# Patient Record
Sex: MALE | Race: BLACK OR AFRICAN AMERICAN | NOT HISPANIC OR LATINO | URBAN - METROPOLITAN AREA
[De-identification: names, ages, dates, MRNs, and addresses within clinical notes are randomized per-mention and may not be internally consistent; named-entity substitution may affect disease eponyms.]

---

## 2021-04-30 ENCOUNTER — EMERGENCY (EMERGENCY)
Facility: HOSPITAL | Age: 61
LOS: 1 days | Discharge: HOME | End: 2021-04-30
Admitting: EMERGENCY MEDICINE
Payer: MEDICAID

## 2021-04-30 VITALS
SYSTOLIC BLOOD PRESSURE: 120 MMHG | WEIGHT: 229.94 LBS | HEART RATE: 73 BPM | OXYGEN SATURATION: 97 % | HEIGHT: 68 IN | TEMPERATURE: 98 F | DIASTOLIC BLOOD PRESSURE: 82 MMHG | RESPIRATION RATE: 16 BRPM

## 2021-04-30 DIAGNOSIS — M25.561 PAIN IN RIGHT KNEE: ICD-10-CM

## 2021-04-30 DIAGNOSIS — G89.29 OTHER CHRONIC PAIN: ICD-10-CM

## 2021-04-30 DIAGNOSIS — I10 ESSENTIAL (PRIMARY) HYPERTENSION: ICD-10-CM

## 2021-04-30 DIAGNOSIS — Z88.0 ALLERGY STATUS TO PENICILLIN: ICD-10-CM

## 2021-04-30 DIAGNOSIS — M25.461 EFFUSION, RIGHT KNEE: ICD-10-CM

## 2021-04-30 DIAGNOSIS — I63.9 CEREBRAL INFARCTION, UNSPECIFIED: ICD-10-CM

## 2021-04-30 PROCEDURE — 73564 X-RAY EXAM KNEE 4 OR MORE: CPT

## 2021-04-30 PROCEDURE — 73564 X-RAY EXAM KNEE 4 OR MORE: CPT | Mod: 26,RT

## 2021-04-30 PROCEDURE — 96372 THER/PROPH/DIAG INJ SC/IM: CPT

## 2021-04-30 PROCEDURE — 99283 EMERGENCY DEPT VISIT LOW MDM: CPT | Mod: 25

## 2021-04-30 RX ORDER — KETOROLAC TROMETHAMINE 30 MG/ML
30 SYRINGE (ML) INJECTION ONCE
Refills: 0 | Status: DISCONTINUED | OUTPATIENT
Start: 2021-04-30 | End: 2021-04-30

## 2021-04-30 RX ADMIN — Medication 30 MILLIGRAM(S): at 13:49

## 2021-04-30 NOTE — ED PROVIDER NOTE - PHYSICAL EXAMINATION
Vitals reviewed  Gen: well appearing, nad, speaking in full sentences  Skin: wwp, no rash/lesions/erythema or warmth   HEENT: ncat, eomi, mmm  CV: rrr, no audible m/r/g  Resp: symmetrical expansion, ctab, no w/r/r  RLE: pain w/ ROM knee but FROM all joints, mild swelling knee but no effusion palpable, + ttp medial aspect, DP/PT pulse 2+, SILT, no calf ttp/edema  FROM all other ext, NVI  Neuro: alert/oriented, no focal deficits, steady gait without assistance

## 2021-04-30 NOTE — ED PROVIDER NOTE - PATIENT PORTAL LINK FT
You can access the FollowMyHealth Patient Portal offered by [facility] by registering at the following website: [email or web address]. By joining [employer]’s FollowMyHealth portal, you will also be able to view your health information using other applications (apps) compatible with our system.

## 2021-04-30 NOTE — ED ADULT TRIAGE NOTE - CHIEF COMPLAINT QUOTE
Pt c/o R knee pain for x2 weeks. pt states he's had issues with it ever since having a stroke in 2019. Denies injury. Pt sees PT and intermittently has to have knee drained. Denies hx of joint infection. R knee +swelling noted, ambulates with cane with steady gait.

## 2021-04-30 NOTE — ED PROVIDER NOTE - OBJECTIVE STATEMENT
60 M pmh htn, prior CVA p/w R knee pain and swelling x 2 weeks.  Pt reports chronic R knee pain s/p CVA and follows with ortho at [facility], had joint aspiration 2 weeks ago but pain has been persistent when aspiration usually alleviates pain.  He has been taking tylenol and applying topical analgesics without relief.  He states he didn't call ortho specialist because he lives closer to [facility] so just wanted to come here.  Pt has fall approx 6 weeks ago onto R knee, reports never had imaging.  Denies f/c, headache, dizziness, fainting, chest pain, sob, nvd, limited ROM joints, calf pain/swelling, recent travel/immobilization, skin changes.

## 2021-04-30 NOTE — ED PROVIDER NOTE - CLINICAL SUMMARY MEDICAL DECISION MAKING FREE TEXT BOX
60 M pmh htn, prior CVA p/w R knee pain and swelling x 2 weeks; fall 6 weeks ago and had joint aspiration for effusion 2 weeks ago.  on exam pt afebrile, well appearing, steady gait, RLE: pain w/ ROM knee but FROM all joints, mild swelling knee but no effusion palpable, + ttp medial aspect, DP/PT pulse 2+, SILT, no calf ttp/edema.  no concern for septic joint, xray shows no evidence of fracture/dislocation.  educated pt on RICE, outpt f/u with ortho and discussed strict return parameters

## 2021-04-30 NOTE — ED PROVIDER NOTE - NSFOLLOWUPINSTRUCTIONS_ED_ALL_ED_FT
Take tylenol 650mg or motrin 400-800mg as needed every 4-6 hours for pain.   REST- Rest your hurting/injured joint or extremity to decrease pain and swelling for 24-48 hours    ICE- Apply ice to area of pain to decreased inflammation and pain, put towel/barrier between ice and skin. You can keep ice on for 20 minutes at a time 4-8 times daily   COMPRESSION- Wear ace wrap or brace for support to reduce swelling.  Make sure not to wrap too tight, loosen if skin feeling numb/tingling or skin turns blue   ELEVATION- Elevate hurting/injured area 6 or more inches about level of heart to decrease swelling/inflammation.  Use pillow under joint to elevate area    [facility] (Thursday afternoons) - call [phone] to schedule    You may call our referrals coordinator at [phone] Monday to Friday 11am-7pm for assistance with making an appointment